# Patient Record
Sex: FEMALE | URBAN - METROPOLITAN AREA
[De-identification: names, ages, dates, MRNs, and addresses within clinical notes are randomized per-mention and may not be internally consistent; named-entity substitution may affect disease eponyms.]

---

## 2024-11-19 ENCOUNTER — OFFICE VISIT (OUTPATIENT)
Dept: URGENT CARE | Age: 54
End: 2024-11-19
Payer: OTHER GOVERNMENT

## 2024-11-19 VITALS
SYSTOLIC BLOOD PRESSURE: 106 MMHG | OXYGEN SATURATION: 95 % | TEMPERATURE: 97.2 F | WEIGHT: 190 LBS | DIASTOLIC BLOOD PRESSURE: 70 MMHG | RESPIRATION RATE: 16 BRPM | HEART RATE: 72 BPM

## 2024-11-19 DIAGNOSIS — U07.1 COVID: Primary | ICD-10-CM

## 2024-11-19 DIAGNOSIS — J40 BRONCHITIS: ICD-10-CM

## 2024-11-19 PROCEDURE — 99213 OFFICE O/P EST LOW 20 MIN: CPT | Performed by: PHYSICIAN ASSISTANT

## 2024-11-19 RX ORDER — LORAZEPAM 0.5 MG/1
0.5 TABLET ORAL EVERY 6 HOURS PRN
COMMUNITY

## 2024-11-19 RX ORDER — ESCITALOPRAM OXALATE 5 MG/1
5 TABLET ORAL DAILY
COMMUNITY

## 2024-11-19 RX ORDER — BENZONATATE 200 MG/1
200 CAPSULE ORAL 3 TIMES DAILY PRN
Qty: 21 CAPSULE | Refills: 0 | Status: SHIPPED | OUTPATIENT
Start: 2024-11-19 | End: 2024-11-26

## 2024-11-19 RX ORDER — CHLORTHALIDONE 25 MG/1
25 TABLET ORAL
COMMUNITY
Start: 2024-10-07

## 2024-11-19 RX ORDER — LISINOPRIL 40 MG/1
1 TABLET ORAL DAILY
COMMUNITY
Start: 2024-10-07

## 2024-11-19 RX ORDER — METOPROLOL SUCCINATE 100 MG/1
1 TABLET, EXTENDED RELEASE ORAL DAILY
COMMUNITY
Start: 2024-10-07

## 2024-11-19 RX ORDER — FENOFIBRATE 54 MG/1
54 TABLET ORAL
COMMUNITY
Start: 2024-09-09

## 2024-11-19 RX ORDER — ROSUVASTATIN CALCIUM 20 MG/1
20 TABLET, COATED ORAL
COMMUNITY
Start: 2024-10-07

## 2024-11-19 RX ORDER — PREDNISONE 20 MG/1
20 TABLET ORAL 2 TIMES DAILY
Qty: 10 TABLET | Refills: 0 | Status: SHIPPED | OUTPATIENT
Start: 2024-11-19 | End: 2024-11-24

## 2024-11-19 ASSESSMENT — ENCOUNTER SYMPTOMS
FATIGUE: 0
VOMITING: 0
SHORTNESS OF BREATH: 0
COUGH: 1
VOICE CHANGE: 0
CHILLS: 0
AGITATION: 0
SORE THROAT: 0
ARTHRALGIAS: 0
SINUS PAIN: 0
WHEEZING: 0
DIARRHEA: 0
FEVER: 0
NAUSEA: 0
CHEST TIGHTNESS: 0
CONFUSION: 0
SINUS PRESSURE: 0
JOINT SWELLING: 0
ABDOMINAL PAIN: 0

## 2024-11-19 NOTE — PATIENT INSTRUCTIONS
Take medications as instructed, continue OTC cold meds, rest, hydration  F/U with PCP for lingering symptoms  ER for red flags

## 2024-11-19 NOTE — PROGRESS NOTES
Subjective   Patient ID: Michele Severson is a 54 y.o. female. They present today with a chief complaint of Cough.    History of Present Illness  Pt walked in with chief complaint of lingering covid symptoms. Reports lingering dry cough but denies fever, chills, SOB, chest tightness. States cough usually worse at night and she couldn't sleep well at night. Wanted to get some cough medication. Denies hx of chronic lung disease.       Cough  Pertinent negatives include no chest pain, chills, fever, rash, sore throat, shortness of breath or wheezing.       Past Medical History  Allergies as of 11/19/2024    (No Known Allergies)       (Not in a hospital admission)       History reviewed. No pertinent past medical history.    History reviewed. No pertinent surgical history.     reports that she has been smoking cigarettes. She does not have any smokeless tobacco history on file. She reports that she does not use drugs.    Review of Systems  Review of Systems   Constitutional:  Negative for chills, fatigue and fever.   HENT:  Negative for congestion, sinus pressure, sinus pain, sore throat and voice change.    Respiratory:  Positive for cough. Negative for chest tightness, shortness of breath and wheezing.    Cardiovascular:  Negative for chest pain.   Gastrointestinal:  Negative for abdominal pain, diarrhea, nausea and vomiting.   Musculoskeletal:  Negative for arthralgias and joint swelling.   Skin:  Negative for rash.   Psychiatric/Behavioral:  Negative for agitation and confusion.                                   Objective    Vitals:    11/19/24 1307   BP: 106/70   Pulse: 72   Resp: 16   Temp: 36.2 °C (97.2 °F)   SpO2: 95%   Weight: 86.2 kg (190 lb)     No LMP recorded.    Physical Exam  Constitutional:       Appearance: Normal appearance.   HENT:      Head: Normocephalic and atraumatic.      Right Ear: Tympanic membrane, ear canal and external ear normal.      Left Ear: Tympanic membrane, ear canal and external ear  normal.      Nose: Nose normal.   Cardiovascular:      Rate and Rhythm: Normal rate and regular rhythm.      Heart sounds: No murmur heard.  Pulmonary:      Effort: Pulmonary effort is normal. No respiratory distress.      Breath sounds: No stridor. No wheezing, rhonchi or rales.   Musculoskeletal:         General: No swelling or tenderness. Normal range of motion.   Neurological:      Mental Status: She is alert and oriented to person, place, and time.   Psychiatric:         Mood and Affect: Mood normal.         Procedures    Point of Care Test & Imaging Results from this visit  No results found for this visit on 11/19/24.   No results found.    Diagnostic study results (if any) were reviewed by Emelia Mason PA-C.    Assessment/Plan   Allergies, medications, history, and pertinent labs/EKGs/Imaging reviewed by Emelia Mason PA-C.     Medical Decision Making  VSS, lung CTAB  Lingering COVID syndrome, will treat with short course of steroid at this point low suspicion for COVID pneumonia or respiratory distress    Orders and Diagnoses  Diagnoses and all orders for this visit:  COVID  Bronchitis  -     predniSONE (Deltasone) 20 mg tablet; Take 1 tablet (20 mg) by mouth 2 times a day for 5 days.  -     benzonatate (Tessalon) 200 mg capsule; Take 1 capsule (200 mg) by mouth 3 times a day as needed for cough for up to 7 days. Do not crush or chew.      Medical Admin Record      Patient disposition: Home    Electronically signed by Emelia Mason PA-C  1:30 PM